# Patient Record
Sex: FEMALE | Race: WHITE | NOT HISPANIC OR LATINO
[De-identification: names, ages, dates, MRNs, and addresses within clinical notes are randomized per-mention and may not be internally consistent; named-entity substitution may affect disease eponyms.]

---

## 2019-04-04 ENCOUNTER — TRANSCRIPTION ENCOUNTER (OUTPATIENT)
Age: 44
End: 2019-04-04

## 2021-10-04 ENCOUNTER — TRANSCRIPTION ENCOUNTER (OUTPATIENT)
Age: 46
End: 2021-10-04

## 2022-11-29 PROBLEM — Z00.00 ENCOUNTER FOR PREVENTIVE HEALTH EXAMINATION: Status: ACTIVE | Noted: 2022-11-29

## 2022-11-30 ENCOUNTER — APPOINTMENT (OUTPATIENT)
Dept: ORTHOPEDIC SURGERY | Facility: CLINIC | Age: 47
End: 2022-11-30

## 2022-11-30 VITALS — HEIGHT: 67 IN | WEIGHT: 130 LBS | BODY MASS INDEX: 20.4 KG/M2

## 2022-11-30 DIAGNOSIS — G57.62 LESION OF PLANTAR NERVE, LEFT LOWER LIMB: ICD-10-CM

## 2022-11-30 DIAGNOSIS — M25.572 PAIN IN LEFT ANKLE AND JOINTS OF LEFT FOOT: ICD-10-CM

## 2022-11-30 DIAGNOSIS — Z78.9 OTHER SPECIFIED HEALTH STATUS: ICD-10-CM

## 2022-11-30 DIAGNOSIS — M77.42 METATARSALGIA, LEFT FOOT: ICD-10-CM

## 2022-11-30 PROCEDURE — 73610 X-RAY EXAM OF ANKLE: CPT | Mod: LT

## 2022-11-30 PROCEDURE — 99203 OFFICE O/P NEW LOW 30 MIN: CPT

## 2022-11-30 PROCEDURE — 73630 X-RAY EXAM OF FOOT: CPT | Mod: LT

## 2022-11-30 NOTE — IMAGING
[de-identified] : General: Well-nourished, well developed, in no apparent distress\par Psych: Clear speech, pleasant mood and affect\par Neurological: Alert and oriented to person, place, and time\par Gait: Normal\par Respiratory: Normal respiratory effort\par Skin: No rashes, no lesions, no open skin, no ecchymosis, no erythema\par \par \par Inspection: No swelling, ecchymosis or redness noted.\par \par Alignment: Hindfoot alignment in anatomic valgus, neutral midfoot alignment.\par  \par Palpation: No anterior medial, central or lateral ankle joint line tenderness. No posterior medial or lateral ankle pain. No pain over proximal, midshaft or distal tibia or fibula. Leg Compartments are soft and nontender. Calf is soft and non-tender with a down-going Guzman test. No pain over the lateral and medial malleolus. No pain over ATFL and CFL. Non-tender over the deltoid ligament or proximal and distal syndesmosis. Negative squeeze test of the syndesmosis. Non-tender over the fibula head or neck. Non-tender over the sinus tarsi.\par No pain over the course of the achilles, peroneal, posterior tibial, anterior tibial or the extensor tendons. \par On examination of the foot: Foot compartments are soft and nontender. No pain over the heel or plantar fascia. No tenderness over the transverse tarsal joints, TMT or the Lisfranc joints on palpation and stress examination. No tenderness over the navicular, cuboid, cuneiforms, or metatarsals shafts. No pain beneath the metatarsal heads. Full range of motion through the MTP joints. The toes are reduced and well aligned. No pain on examination of the toes, and no webspace tenderness noted\par \par ROM: 15-20 degrees of dorsiflexion, 40 degrees of plantar flexion, 20 degrees of inversion and 20 degrees of eversion without pain. Able to flex and extend all toes without pain \par \par Muscle Strength: 5/5 strength with resisted dorsiflexion, plantar flexion, inversion and eversion without any pain.\par \par Stability: No instability or laxity noted with varus/valgus stress. Negative anterior and posterior drawer test. No pain with dorsiflexion external rotation stress test.\par \par Neurologic Exam : Sensation is grossly intact bilateral upper and lower extremities to light touch throughout. Sensation intact to the sural, posterior tibial, superficial peroneal nerve. 2+ patellar tendon and Achilles tendon reflexes are noted. There is a downgoing Babinski test. No clonus is noted bilaterally.\par \par Vascular: Arterial Pulses right and Left: posterior tibialis normal and dorsalis pedis normal. Right and Left: no edema, no varicosities and brisk capillary refill test normal.\par \par Radiographs: X-rays done today in the office 3 views of the ankle and foot without any limitations which reveal: No acute fractures or dislocations seen. The ankle mortise and syndesmosis are reduced and well aligned. There is no widening of the syndesmosis. No evidence of an osteochondral defect. No fractures of the lateral process of the talus or of the anterior process of the calcaneus noted. The midfoot and forefoot joints are reduced and well aligned.  Relatively long second and third metatarsals are noted

## 2022-11-30 NOTE — ASSESSMENT
[FreeTextEntry1] : After her examination today in the office and review of her radiographs I do feel that her pain as well as most of her numbness and tingling and electrical sensation is most likely secondary from a third webspace neuroma with a tighter fitting sneaker or shoe such as a hiking shoe that she wears.  I did recommend a wider toe box shoe with increased cushioning beneath the ball of her foot and we reviewed her radiographs which we discussed she does have a relatively long second and third metatarsals which can cause overloading and increased pressure beneath the second third and fourth metatarsals.  We discussed the importance of good supportive shoes and I gave her an example such as a [employer] sneaker that does have very good padding and a slight rocker-bottom and in order to offload the midfoot and forefoot on the ball of her foot.  We discussed the importance of wider toe box shoes that will decrease the amount of compression of the interdigital nerves.  She does not require or want to have a injection at this time she will change her shoewear and see if this resolves her symptoms

## 2022-11-30 NOTE — HISTORY OF PRESENT ILLNESS
[Gradual] : gradual [7] : 7 [0] : 0 [Burning] : burning [Localized] : localized [Throbbing] : throbbing [Frequent] : frequent [Leisure] : leisure [Rest] : rest [Walking] : walking [Stairs] : stairs [de-identified] : Ms. RILEY is a 47 year female who presents today for evaluation of their left foot pain including numbness and tingling.  She states that with certain shoes especially her hiking she states she does feel a sense of numbness of the third and fourth and sometimes the fifth toes with a zinging sensation into her toes.  With taking her shoes off she does notice resolution of the symptoms however she also feels a sense of fullness across the ball of her foot.  She does not have any history of trauma or injury to the ankle foot or toes and she does not notice any discoloration or deformity of the foot or toes [] : no [FreeTextEntry1] : left foot [FreeTextEntry5] : New patient is here for left foot. NKI. Pain has been ongoing for year. Patient only feels pain when she is wearing shoes.  Pain was initiially occasional but now has become more frequent. Patient feels pain on bottom of foot when she goes down stairs sometimes. [FreeTextEntry9] : take shoe off [de-identified] : wearing shoes

## 2023-04-06 ENCOUNTER — NON-APPOINTMENT (OUTPATIENT)
Age: 48
End: 2023-04-06